# Patient Record
Sex: FEMALE | Race: WHITE | NOT HISPANIC OR LATINO | Employment: OTHER | URBAN - METROPOLITAN AREA
[De-identification: names, ages, dates, MRNs, and addresses within clinical notes are randomized per-mention and may not be internally consistent; named-entity substitution may affect disease eponyms.]

---

## 2017-08-25 ENCOUNTER — HOSPITAL ENCOUNTER (OUTPATIENT)
Dept: RADIOLOGY | Facility: MEDICAL CENTER | Age: 62
End: 2017-08-25
Attending: FAMILY MEDICINE
Payer: COMMERCIAL

## 2017-08-25 DIAGNOSIS — Z12.31 ENCOUNTER FOR SCREENING MAMMOGRAM FOR MALIGNANT NEOPLASM OF BREAST: ICD-10-CM

## 2017-08-25 PROCEDURE — G0202 SCR MAMMO BI INCL CAD: HCPCS

## 2017-11-21 ENCOUNTER — OFFICE VISIT (OUTPATIENT)
Dept: URGENT CARE | Facility: PHYSICIAN GROUP | Age: 62
End: 2017-11-21

## 2017-11-21 VITALS
RESPIRATION RATE: 14 BRPM | SYSTOLIC BLOOD PRESSURE: 116 MMHG | DIASTOLIC BLOOD PRESSURE: 70 MMHG | WEIGHT: 195 LBS | HEART RATE: 84 BPM | BODY MASS INDEX: 33.29 KG/M2 | OXYGEN SATURATION: 98 % | TEMPERATURE: 97.8 F | HEIGHT: 64 IN

## 2017-11-21 DIAGNOSIS — L60.0 INGROWING TOENAIL WITH INFECTION: ICD-10-CM

## 2017-11-21 PROCEDURE — 99204 OFFICE O/P NEW MOD 45 MIN: CPT | Performed by: FAMILY MEDICINE

## 2017-11-21 RX ORDER — DOXYCYCLINE HYCLATE 100 MG
100 TABLET ORAL 2 TIMES DAILY
Qty: 20 TAB | Refills: 0 | Status: SHIPPED | OUTPATIENT
Start: 2017-11-21 | End: 2017-12-01

## 2017-11-21 ASSESSMENT — ENCOUNTER SYMPTOMS
SHORTNESS OF BREATH: 0
NUMBNESS: 0
DIZZINESS: 0
SORE THROAT: 0
FEVER: 0
WEAKNESS: 0
NAUSEA: 0
EYE PAIN: 0
VOMITING: 0
MYALGIAS: 0
CHILLS: 0

## 2017-11-21 ASSESSMENT — PAIN SCALES - GENERAL: PAINLEVEL: 8=MODERATE-SEVERE PAIN

## 2017-11-21 NOTE — PATIENT INSTRUCTIONS
Infected Ingrown Toenail  An infected ingrown toenail occurs when the nail edge grows into the skin and bacteria invade the area. Symptoms include pain, tenderness, swelling, and pus drainage from the edge of the nail. Poorly fitting shoes, minor injuries, and improper cutting of the toenail may also contribute to the problem. You should cut your toenails squarely instead of rounding the edges. Do not cut them too short. Avoid tight or pointed toe shoes. Sometimes the ingrown portion of the nail must be removed. If your toenail is removed, it can take 3-4 months for it to re-grow.  HOME CARE INSTRUCTIONS   · Soak your infected toe in warm water for 20-30 minutes, 2 to 3 times a day.  · Packing or dressings applied to the area should be changed daily.  · Take medicine as directed and finish them.  · Reduce activities and keep your foot elevated when able to reduce swelling and discomfort. Do this until the infection gets better.  · Wear sandals or go barefoot as much as possible while the infected area is sensitive.  · See your caregiver for follow-up care in 2-3 days if the infection is not better.  SEEK MEDICAL CARE IF:   Your toe is becoming more red, swollen or painful.  MAKE SURE YOU:   · Understand these instructions.  · Will watch your condition.  · Will get help right away if you are not doing well or get worse.  Document Released: 01/25/2006 Document Revised: 03/11/2013 Document Reviewed: 12/14/2009  [employer]® Patient Information ©2014 [employer].

## 2017-11-21 NOTE — PROGRESS NOTES
"  Subjective:     Federica Singleton a 61 y.o. female who presents for Nail Problem (left foot, toe infection, x 10 days)       Nail Problem   This is a new problem. The current episode started 1 to 4 weeks ago. The problem occurs constantly. The problem has been rapidly worsening. Pertinent negatives include no chest pain, chills, fever, myalgias, nausea, numbness, rash, sore throat, vomiting or weakness.   History reviewed. No pertinent past medical history.  Past Surgical History:   Procedure Laterality Date   • GASTRIC BYPASS LAPAROSCOPIC       Social History     Social History   • Marital status:      Spouse name: N/A   • Number of children: N/A   • Years of education: N/A     Occupational History   • Not on file.     Social History Main Topics   • Smoking status: Former Smoker     Quit date: 11/21/2016   • Smokeless tobacco: Never Used   • Alcohol use No   • Drug use: No   • Sexual activity: Not on file     Other Topics Concern   • Not on file     Social History Narrative   • No narrative on file      Family History   Problem Relation Age of Onset   • Stroke Neg Hx    • Heart Disease Neg Hx     Review of Systems   Constitutional: Negative for chills and fever.   HENT: Negative for sore throat.    Eyes: Negative for pain.   Respiratory: Negative for shortness of breath.    Cardiovascular: Negative for chest pain.   Gastrointestinal: Negative for nausea and vomiting.   Genitourinary: Negative for hematuria.   Musculoskeletal: Negative for myalgias.   Skin: Negative for rash.   Neurological: Negative for dizziness, weakness and numbness.     Allergies   Allergen Reactions   • Sulfa Drugs Hives      Objective:   /70   Pulse 84   Temp 36.6 °C (97.8 °F)   Resp 14   Ht 1.626 m (5' 4\")   Wt 88.5 kg (195 lb)   LMP 11/21/2007   SpO2 98%   Breastfeeding? No   BMI 33.47 kg/m²   Physical Exam   Constitutional: She is oriented to person, place, and time. She appears well-developed and well-nourished. No " distress.   HENT:   Head: Normocephalic and atraumatic.   Eyes: Conjunctivae and EOM are normal. Pupils are equal, round, and reactive to light.   Cardiovascular: Normal rate and regular rhythm.    No murmur heard.  Pulmonary/Chest: Effort normal and breath sounds normal. No respiratory distress.   Abdominal: Soft. She exhibits no distension. There is no tenderness.   Neurological: She is alert and oriented to person, place, and time. She has normal reflexes. No sensory deficit.   Skin: Skin is warm and dry.        Psychiatric: She has a normal mood and affect.         Assessment/Plan:   Assessment    1. Ingrowing toenail with infection  Differential diagnosis, natural history, supportive care, and indications for immediate follow-up discussed.   - doxycycline (VIBRAMYCIN) 100 MG Tab; Take 1 Tab by mouth 2 times a day for 10 days.  Dispense: 20 Tab; Refill: 0

## 2018-02-02 ENCOUNTER — HOSPITAL ENCOUNTER (OUTPATIENT)
Dept: LAB | Facility: MEDICAL CENTER | Age: 63
End: 2018-02-02
Attending: FAMILY MEDICINE
Payer: COMMERCIAL

## 2018-02-02 LAB
25(OH)D3 SERPL-MCNC: 5 NG/ML (ref 30–100)
ALBUMIN SERPL BCP-MCNC: 3.6 G/DL (ref 3.2–4.9)
ALBUMIN/GLOB SERPL: 1.2 G/DL
ALP SERPL-CCNC: 202 U/L (ref 30–99)
ALT SERPL-CCNC: 16 U/L (ref 2–50)
ANION GAP SERPL CALC-SCNC: 5 MMOL/L (ref 0–11.9)
AST SERPL-CCNC: 21 U/L (ref 12–45)
BASOPHILS # BLD AUTO: 1.1 % (ref 0–1.8)
BASOPHILS # BLD: 0.04 K/UL (ref 0–0.12)
BILIRUB SERPL-MCNC: 0.3 MG/DL (ref 0.1–1.5)
BUN SERPL-MCNC: 9 MG/DL (ref 8–22)
CALCIUM SERPL-MCNC: 8.4 MG/DL (ref 8.5–10.5)
CHLORIDE SERPL-SCNC: 109 MMOL/L (ref 96–112)
CHOLEST SERPL-MCNC: 153 MG/DL (ref 100–199)
CO2 SERPL-SCNC: 25 MMOL/L (ref 20–33)
CREAT SERPL-MCNC: 0.75 MG/DL (ref 0.5–1.4)
EOSINOPHIL # BLD AUTO: 0.11 K/UL (ref 0–0.51)
EOSINOPHIL NFR BLD: 2.9 % (ref 0–6.9)
ERYTHROCYTE [DISTWIDTH] IN BLOOD BY AUTOMATED COUNT: 47.8 FL (ref 35.9–50)
FERRITIN SERPL-MCNC: 5 NG/ML (ref 10–291)
GLOBULIN SER CALC-MCNC: 3 G/DL (ref 1.9–3.5)
GLUCOSE SERPL-MCNC: 76 MG/DL (ref 65–99)
HCT VFR BLD AUTO: 36 % (ref 37–47)
HDLC SERPL-MCNC: 57 MG/DL
HGB BLD-MCNC: 11.1 G/DL (ref 12–16)
IMM GRANULOCYTES # BLD AUTO: 0.01 K/UL (ref 0–0.11)
IMM GRANULOCYTES NFR BLD AUTO: 0.3 % (ref 0–0.9)
LDLC SERPL CALC-MCNC: 84 MG/DL
LYMPHOCYTES # BLD AUTO: 1.28 K/UL (ref 1–4.8)
LYMPHOCYTES NFR BLD: 34 % (ref 22–41)
MCH RBC QN AUTO: 23.9 PG (ref 27–33)
MCHC RBC AUTO-ENTMCNC: 30.8 G/DL (ref 33.6–35)
MCV RBC AUTO: 77.4 FL (ref 81.4–97.8)
MONOCYTES # BLD AUTO: 0.36 K/UL (ref 0–0.85)
MONOCYTES NFR BLD AUTO: 9.6 % (ref 0–13.4)
NEUTROPHILS # BLD AUTO: 1.96 K/UL (ref 2–7.15)
NEUTROPHILS NFR BLD: 52.1 % (ref 44–72)
NRBC # BLD AUTO: 0 K/UL
NRBC BLD-RTO: 0 /100 WBC
PLATELET # BLD AUTO: 222 K/UL (ref 164–446)
PMV BLD AUTO: 11.3 FL (ref 9–12.9)
POTASSIUM SERPL-SCNC: 4.3 MMOL/L (ref 3.6–5.5)
PROT SERPL-MCNC: 6.6 G/DL (ref 6–8.2)
RBC # BLD AUTO: 4.65 M/UL (ref 4.2–5.4)
SODIUM SERPL-SCNC: 139 MMOL/L (ref 135–145)
TRIGL SERPL-MCNC: 62 MG/DL (ref 0–149)
WBC # BLD AUTO: 3.8 K/UL (ref 4.8–10.8)

## 2018-02-02 PROCEDURE — 82728 ASSAY OF FERRITIN: CPT

## 2018-02-02 PROCEDURE — 82306 VITAMIN D 25 HYDROXY: CPT

## 2018-02-02 PROCEDURE — 80061 LIPID PANEL: CPT

## 2018-02-02 PROCEDURE — 80053 COMPREHEN METABOLIC PANEL: CPT

## 2018-02-02 PROCEDURE — 36415 COLL VENOUS BLD VENIPUNCTURE: CPT

## 2018-02-02 PROCEDURE — 85025 COMPLETE CBC W/AUTO DIFF WBC: CPT

## 2018-02-07 ENCOUNTER — HOSPITAL ENCOUNTER (OUTPATIENT)
Dept: LAB | Facility: MEDICAL CENTER | Age: 63
End: 2018-02-07
Attending: FAMILY MEDICINE
Payer: COMMERCIAL

## 2018-02-07 LAB — GGT SERPL-CCNC: 8 U/L (ref 7–34)

## 2018-02-07 PROCEDURE — 86255 FLUORESCENT ANTIBODY SCREEN: CPT

## 2018-02-07 PROCEDURE — 82977 ASSAY OF GGT: CPT

## 2018-02-07 PROCEDURE — 36415 COLL VENOUS BLD VENIPUNCTURE: CPT

## 2018-02-08 LAB — MITOCHONDRIA M2 IGG SER-ACNC: 3.7 UNITS (ref 0–20)

## 2018-03-14 ENCOUNTER — HOSPITAL ENCOUNTER (OUTPATIENT)
Dept: LAB | Facility: MEDICAL CENTER | Age: 63
End: 2018-03-14
Attending: FAMILY MEDICINE
Payer: COMMERCIAL

## 2018-03-14 LAB
25(OH)D3 SERPL-MCNC: 52 NG/ML (ref 30–100)
FERRITIN SERPL-MCNC: 7 NG/ML (ref 10–291)

## 2018-03-14 PROCEDURE — 36415 COLL VENOUS BLD VENIPUNCTURE: CPT

## 2018-03-14 PROCEDURE — 82728 ASSAY OF FERRITIN: CPT

## 2018-03-14 PROCEDURE — 82306 VITAMIN D 25 HYDROXY: CPT

## 2018-06-09 ENCOUNTER — HOSPITAL ENCOUNTER (OUTPATIENT)
Dept: LAB | Facility: MEDICAL CENTER | Age: 63
End: 2018-06-09
Attending: FAMILY MEDICINE
Payer: COMMERCIAL

## 2018-06-09 LAB
25(OH)D3 SERPL-MCNC: 18 NG/ML (ref 30–100)
ALP SERPL-CCNC: 185 U/L (ref 30–99)
FERRITIN SERPL-MCNC: 22.7 NG/ML (ref 10–291)
HCT VFR BLD AUTO: 43.9 % (ref 37–47)
HGB BLD-MCNC: 14.4 G/DL (ref 12–16)

## 2018-06-09 PROCEDURE — 82306 VITAMIN D 25 HYDROXY: CPT

## 2018-06-09 PROCEDURE — 85014 HEMATOCRIT: CPT

## 2018-06-09 PROCEDURE — 36415 COLL VENOUS BLD VENIPUNCTURE: CPT

## 2018-06-09 PROCEDURE — 84075 ASSAY ALKALINE PHOSPHATASE: CPT

## 2018-06-09 PROCEDURE — 85018 HEMOGLOBIN: CPT

## 2018-06-09 PROCEDURE — 82728 ASSAY OF FERRITIN: CPT

## 2018-10-11 ENCOUNTER — IMMUNIZATION (OUTPATIENT)
Dept: SOCIAL WORK | Facility: CLINIC | Age: 63
End: 2018-10-11
Payer: COMMERCIAL

## 2018-10-11 DIAGNOSIS — Z23 NEED FOR VACCINATION: ICD-10-CM

## 2018-10-11 PROCEDURE — 90686 IIV4 VACC NO PRSV 0.5 ML IM: CPT | Performed by: REGISTERED NURSE

## 2018-10-11 PROCEDURE — 90471 IMMUNIZATION ADMIN: CPT | Performed by: REGISTERED NURSE

## 2018-10-24 ENCOUNTER — OFFICE VISIT (OUTPATIENT)
Dept: URGENT CARE | Facility: PHYSICIAN GROUP | Age: 63
End: 2018-10-24
Payer: COMMERCIAL

## 2018-10-24 VITALS
HEART RATE: 72 BPM | TEMPERATURE: 99 F | WEIGHT: 196 LBS | OXYGEN SATURATION: 100 % | RESPIRATION RATE: 16 BRPM | SYSTOLIC BLOOD PRESSURE: 116 MMHG | DIASTOLIC BLOOD PRESSURE: 70 MMHG | BODY MASS INDEX: 33.46 KG/M2 | HEIGHT: 64 IN

## 2018-10-24 DIAGNOSIS — J06.9 VIRAL URI WITH COUGH: ICD-10-CM

## 2018-10-24 LAB
FLUAV+FLUBV AG SPEC QL IA: NEGATIVE
INT CON NEG: NORMAL
INT CON NEG: NORMAL
INT CON POS: NORMAL
INT CON POS: NORMAL
S PYO AG THROAT QL: NEGATIVE

## 2018-10-24 PROCEDURE — 87880 STREP A ASSAY W/OPTIC: CPT | Performed by: FAMILY MEDICINE

## 2018-10-24 PROCEDURE — 99214 OFFICE O/P EST MOD 30 MIN: CPT | Performed by: FAMILY MEDICINE

## 2018-10-24 PROCEDURE — 87804 INFLUENZA ASSAY W/OPTIC: CPT | Performed by: FAMILY MEDICINE

## 2018-10-24 NOTE — PROGRESS NOTES
"CC:  cough        Cough  This is a new problem. The current episode started 2 days ago. The problem has been unchanged. The problem occurs constantly. The cough is dry. Associated symptoms include : sore throat, fever , but she denies muscle aches. Pertinent negatives include no    , nausea, vomiting, diarrhea, sweats, weight loss or wheezing. Nothing aggravates the symptoms.  Patient has tried nothing for the symptoms. There is no history of asthma.       Past medical history was unremarkable and not pertinent to current issue      Social History   Substance Use Topics   • Smoking status: Former Smoker     Quit date: 11/21/2016   • Smokeless tobacco: Never Used   • Alcohol use No         No current outpatient prescriptions on file prior to visit.     No current facility-administered medications on file prior to visit.                     Review of Systems   Constitutional: Negative for   weight loss. + subj fever at home   HENT: negative for otalgia  Cardiovascular - denies chest pain or dyspnea  Respiratory: Positive for cough.  .  Negative for wheezing.    Neurological: Negative for headaches.   GI - denies nausea, vomiting or diarrhea  Neuro - denies numbness or tingling.            Objective:     Blood pressure 116/70, pulse 72, temperature 37.2 °C (99 °F), temperature source Temporal, resp. rate 16, height 1.626 m (5' 4\"), weight 88.9 kg (196 lb), SpO2 100 %, not currently breastfeeding.    Physical Exam   Constitutional: patient is oriented to person, place, and time. Patient appears well-developed and well-nourished. No distress.   HENT:   Head: Normocephalic and atraumatic.   Right Ear: External ear normal.   Left Ear: External ear normal.   Nose: Mucosal edema  present. Right sinus exhibits no maxillary sinus tenderness. Left sinus exhibits no maxillary sinus tenderness.   Mouth/Throat: Mucous membranes are normal. No oral lesions.  No posterior pharyngeal erythema.  No oropharyngeal exudate or posterior " oropharyngeal edema.   Eyes: Conjunctivae and EOM are normal. Pupils are equal, round, and reactive to light. Right eye exhibits no discharge. Left eye exhibits no discharge. No scleral icterus.   Neck: Normal range of motion. Neck supple. No tracheal deviation present.   Cardiovascular: Normal rate, regular rhythm and normal heart sounds.  Exam reveals no friction rub.    Pulmonary/Chest: Effort normal. No respiratory distress. Patient has no wheezes or rhonchi. Patient has no rales.    Musculoskeletal:  exhibits no edema.   Lymphadenopathy:     Patient has no cervical adenopathy.      Neurological: patient is alert and oriented to person, place, and time.   Skin: Skin is warm and dry. No rash noted. No erythema.   Psychiatric: patient  has a normal mood and affect.  behavior is normal.   Nursing note and vitals reviewed.              Assessment/Plan:       1. Viral URI with cough  Rapid strep, flu neg       Rx motrin 800mg tid, prn  Follow up in one week if no improvement

## 2019-10-24 ENCOUNTER — IMMUNIZATION (OUTPATIENT)
Dept: SOCIAL WORK | Facility: CLINIC | Age: 64
End: 2019-10-24
Payer: COMMERCIAL

## 2019-10-24 DIAGNOSIS — Z23 NEED FOR VACCINATION: ICD-10-CM

## 2019-10-24 PROCEDURE — 90686 IIV4 VACC NO PRSV 0.5 ML IM: CPT | Performed by: REGISTERED NURSE

## 2019-10-24 PROCEDURE — 90471 IMMUNIZATION ADMIN: CPT | Performed by: REGISTERED NURSE

## 2020-07-19 ENCOUNTER — HOSPITAL ENCOUNTER (EMERGENCY)
Dept: HOSPITAL 8 - ED | Age: 65
Discharge: HOME | End: 2020-07-19
Payer: COMMERCIAL

## 2020-07-19 VITALS — SYSTOLIC BLOOD PRESSURE: 125 MMHG | DIASTOLIC BLOOD PRESSURE: 70 MMHG

## 2020-07-19 VITALS — WEIGHT: 227.3 LBS | HEIGHT: 66 IN | BODY MASS INDEX: 36.53 KG/M2
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
## 2020-07-19 DIAGNOSIS — S83.91XA: Primary | ICD-10-CM

## 2020-07-19 DIAGNOSIS — M79.661: ICD-10-CM
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
## 2020-07-19 PROCEDURE — 99284 EMERGENCY DEPT VISIT MOD MDM: CPT

## 2020-07-19 PROCEDURE — 29505 APPLICATION LONG LEG SPLINT: CPT

## 2020-07-19 NOTE — NUR
pt returned from RAD.  pt notified that she is to have vicodin and cannot drive 
after.  pt has SO at bedside and reports that he will be driving her home.  
awaiting splint placement

## 2020-07-19 NOTE — NUR
PT HAS SWLLEING OF THE LOWER R LEG    DENIES LONG EXTENDED TRAVEL   DOES REPORT 
R KNEE INJURY SEVERAL MO AGO

## 2021-03-31 ENCOUNTER — IMMUNIZATION (OUTPATIENT)
Dept: FAMILY PLANNING/WOMEN'S HEALTH CLINIC | Facility: IMMUNIZATION CENTER | Age: 66
End: 2021-03-31
Payer: COMMERCIAL

## 2021-03-31 DIAGNOSIS — Z23 ENCOUNTER FOR VACCINATION: Primary | ICD-10-CM

## 2021-03-31 PROCEDURE — 91300 PFIZER SARS-COV-2 VACCINE: CPT

## 2021-03-31 PROCEDURE — 0001A PFIZER SARS-COV-2 VACCINE: CPT

## 2021-04-22 ENCOUNTER — IMMUNIZATION (OUTPATIENT)
Dept: FAMILY PLANNING/WOMEN'S HEALTH CLINIC | Facility: IMMUNIZATION CENTER | Age: 66
End: 2021-04-22
Attending: INTERNAL MEDICINE
Payer: OTHER GOVERNMENT

## 2021-04-22 DIAGNOSIS — Z23 ENCOUNTER FOR VACCINATION: Primary | ICD-10-CM

## 2021-04-22 PROCEDURE — 0002A PFIZER SARS-COV-2 VACCINE: CPT

## 2021-04-22 PROCEDURE — 91300 PFIZER SARS-COV-2 VACCINE: CPT

## 2022-01-21 ENCOUNTER — PATIENT MESSAGE (OUTPATIENT)
Dept: HEALTH INFORMATION MANAGEMENT | Facility: OTHER | Age: 67
End: 2022-01-21
Payer: MEDICARE

## 2022-03-30 ENCOUNTER — TELEPHONE (OUTPATIENT)
Dept: HEALTH INFORMATION MANAGEMENT | Facility: OTHER | Age: 67
End: 2022-03-30
Payer: MEDICARE

## 2022-03-30 NOTE — TELEPHONE ENCOUNTER
Member returned [employer] message. I scheduled her for her Comprehensive Health Assessment on 03/31/22 at 7 am with Jeane Donnelly. I also scheduled her with a new provider Laurence Tobin on 4/6/22 at 12:40 pm. HIPAA verified. I did go over [employer] with her and her  since she does have to get a ride sometimes. She also had questions regarding eyeglasses and optometrists. I did provide her the number to [facility] customer service. No other questions or concerns. No other questions or concerns.

## 2022-03-31 PROBLEM — E66.01 MORBID OBESITY (HCC): Status: ACTIVE | Noted: 2022-03-31

## 2022-03-31 PROBLEM — E55.9 VITAMIN D DEFICIENCY: Status: ACTIVE | Noted: 2022-03-31

## 2022-03-31 PROBLEM — M15.0 PRIMARY OSTEOARTHRITIS INVOLVING MULTIPLE JOINTS: Status: ACTIVE | Noted: 2022-03-31

## 2022-03-31 PROBLEM — M15.9 PRIMARY OSTEOARTHRITIS INVOLVING MULTIPLE JOINTS: Status: ACTIVE | Noted: 2022-03-31

## 2022-04-06 ENCOUNTER — OFFICE VISIT (OUTPATIENT)
Dept: MEDICAL GROUP | Facility: PHYSICIAN GROUP | Age: 67
End: 2022-04-06
Payer: MEDICARE

## 2022-04-06 VITALS
SYSTOLIC BLOOD PRESSURE: 108 MMHG | TEMPERATURE: 97.6 F | BODY MASS INDEX: 41.82 KG/M2 | DIASTOLIC BLOOD PRESSURE: 70 MMHG | HEART RATE: 68 BPM | RESPIRATION RATE: 18 BRPM | OXYGEN SATURATION: 97 % | HEIGHT: 63 IN | WEIGHT: 236 LBS

## 2022-04-06 DIAGNOSIS — E78.00 ELEVATED LDL CHOLESTEROL LEVEL: ICD-10-CM

## 2022-04-06 DIAGNOSIS — Z78.0 POSTMENOPAUSAL STATUS (AGE-RELATED) (NATURAL): ICD-10-CM

## 2022-04-06 DIAGNOSIS — Z12.12 SCREENING FOR COLORECTAL CANCER: ICD-10-CM

## 2022-04-06 DIAGNOSIS — Z12.31 ENCOUNTER FOR SCREENING MAMMOGRAM FOR BREAST CANCER: ICD-10-CM

## 2022-04-06 DIAGNOSIS — Z11.59 NEED FOR HEPATITIS C SCREENING TEST: ICD-10-CM

## 2022-04-06 DIAGNOSIS — Z23 NEED FOR VACCINATION: ICD-10-CM

## 2022-04-06 DIAGNOSIS — E55.9 VITAMIN D DEFICIENCY: Primary | ICD-10-CM

## 2022-04-06 DIAGNOSIS — R74.8 ELEVATED LIVER ENZYMES: ICD-10-CM

## 2022-04-06 DIAGNOSIS — Z12.11 SCREENING FOR COLORECTAL CANCER: ICD-10-CM

## 2022-04-06 DIAGNOSIS — Z13.79 GENETIC SCREENING: ICD-10-CM

## 2022-04-06 DIAGNOSIS — Z86.2 HISTORY OF IRON DEFICIENCY ANEMIA: ICD-10-CM

## 2022-04-06 PROCEDURE — 90715 TDAP VACCINE 7 YRS/> IM: CPT | Performed by: NURSE PRACTITIONER

## 2022-04-06 PROCEDURE — 99204 OFFICE O/P NEW MOD 45 MIN: CPT | Mod: 25 | Performed by: NURSE PRACTITIONER

## 2022-04-06 PROCEDURE — 90471 IMMUNIZATION ADMIN: CPT | Performed by: NURSE PRACTITIONER

## 2022-04-11 ENCOUNTER — HOSPITAL ENCOUNTER (OUTPATIENT)
Dept: RADIOLOGY | Facility: MEDICAL CENTER | Age: 67
End: 2022-04-11
Attending: NURSE PRACTITIONER
Payer: MEDICARE

## 2022-04-11 DIAGNOSIS — Z78.0 POSTMENOPAUSAL STATUS (AGE-RELATED) (NATURAL): ICD-10-CM

## 2022-04-11 PROBLEM — E55.9 VITAMIN D DEFICIENCY: Chronic | Status: ACTIVE | Noted: 2022-03-31

## 2022-04-11 PROBLEM — E78.00 ELEVATED LDL CHOLESTEROL LEVEL: Chronic | Status: ACTIVE | Noted: 2022-04-06

## 2022-04-11 PROBLEM — Z86.2 HISTORY OF IRON DEFICIENCY ANEMIA: Chronic | Status: ACTIVE | Noted: 2022-04-06

## 2022-04-11 PROBLEM — Z13.79 GENETIC SCREENING: Status: ACTIVE | Noted: 2022-04-11

## 2022-04-11 PROCEDURE — 77080 DXA BONE DENSITY AXIAL: CPT

## 2022-04-11 NOTE — ASSESSMENT & PLAN NOTE
Chronic condition, stable.  Patient has a history of elevated LDL.  She does not take any medication for this at this time.  She is due for updated labs today, and orders were placed.

## 2022-04-11 NOTE — ASSESSMENT & PLAN NOTE
Chronic condition, stable.  Patient reports that she does make an effort at diet and exercise, though she has not been able to lose weight.  She will continue to make efforts in this area.  We will continue to monitor.

## 2022-04-11 NOTE — ASSESSMENT & PLAN NOTE
Chronic condition, stable.  Patient reports that she has a history of iron deficiency anemia.  She has not had any laboratory surveillance for this since 2018.  Labs were ordered for patient today.

## 2022-04-11 NOTE — PROGRESS NOTES
"Subjective:     CC: The primary encounter diagnosis was Vitamin D deficiency. Diagnoses of BMI 40.0-44.9, adult (HCC), History of iron deficiency anemia, Elevated LDL cholesterol level, Genetic screening, Encounter for screening mammogram for breast cancer, Postmenopausal status (age-related) (natural), Need for hepatitis C screening test, Screening for colorectal cancer, Elevated liver enzymes, and Need for vaccination were also pertinent to this visit.      HPI:   Federica is a new patient to me today wanting to establish care. She used to see Dr. Dixon, but has not been seen since approximately 2018.  We discussed the following problems:    1. Vitamin D deficiency  Chronic condition, stable.  Patient here for evaluation today.    2. BMI 40.0-44.9, adult (HCC)  Chronic condition, stable.  Patient here for evaluation today.    3. History of iron deficiency anemia  Chronic condition, stable.  Patient here for evaluation today.    4. Elevated LDL cholesterol level  Chronic condition, stable.  Patient here for evaluation today.    5. Genetic screening  Patient would like to participate in the [facility] [place] Project.  Referral was placed today.     6. Encounter for screening mammogram for breast cancer  Order for screening mammogram placed.     7. Postmenopausal status (age-related) (natural)  Order for Dexa scan placed.    8. Need for hepatitis C screening test  Patient states that she has been told she is a \"carrier\" for hepatitis C in the past. She reports she has not had a history of an active infection.  Lab orders were placed today.    9. Screening for colorectal cancer  Order for screening colonoscopy placed.    10. Elevated liver enzymes  Chronic condition.  Patient reports history of being a \"carrier for hepatitis C.\"  She denies any hepatitis C infection.  Hepatitis panel order was placed today.    11. Need for vaccination  Patient was offered and received tetanus vaccine today.       Past Medical History: " "  Diagnosis Date   • Arthritis     left hip and right knee   • Hearing impaired     coongenital since birth - nerve deafness       Social History     Tobacco Use   • Smoking status: Former Smoker     Packs/day: 1.00     Years: 20.00     Pack years: 20.00     Quit date: 2016     Years since quittin.3   • Smokeless tobacco: Never Used   Vaping Use   • Vaping Use: Never used   Substance Use Topics   • Alcohol use: No   • Drug use: No       No current [facility]-ordered outpatient medications on file.     No current [facility]-ordered facility-administered medications on file.       Allergies:  Aspirin and Sulfa drugs    Health Maintenance: Deferred    ROS:  Gen: no fevers/chills, no changes in weight  Eyes: no changes in vision  ENT: no sore throat, no hearing loss, no bloody nose  Pulm: no sob, no cough  CV: no chest pain, no palpitations  GI: no nausea/vomiting, no diarrhea  : no dysuria  MSk: no myalgias  Skin: no rash  Neuro: no headaches, no numbness/tingling  Heme/Lymph: no easy bruising      Objective:       Exam:  /70 (BP Location: Left arm, Patient Position: Sitting, BP Cuff Size: Adult)   Pulse 68   Temp 36.4 °C (97.6 °F) (Temporal)   Resp 18   Ht 1.6 m (5' 3\")   Wt 107 kg (236 lb)   LMP 2007   SpO2 97%   Breastfeeding No   BMI 41.81 kg/m²  Body mass index is 41.81 kg/m².    Gen: Alert and oriented, No apparent distress.  Neck: Neck is supple without lymphadenopathy.  No carotid bruits.  Lungs: Normal effort, CTA bilaterally, no wheezes, rhonchi, or rales  CV: Regular rate and rhythm. No murmurs, rubs, or gallops.  Ext: No clubbing, cyanosis, edema.    Labs: None    Assessment & Plan:     66 y.o. female with the following -     Vitamin D deficiency  Chronic condition, stable.  Patient has not had an updated vitamin D level since 2018.  She does not take supplemental vitamin D.  Labs were ordered for her today.    BMI 40.0-44.9, adult ([facility])  Chronic condition, stable.  Patient reports that " she does make an effort at diet and exercise, though she has not been able to lose weight.  She will continue to make efforts in this area.  We will continue to monitor.    History of iron deficiency anemia  Chronic condition, stable.  Patient reports that she has a history of iron deficiency anemia.  She has not had any laboratory surveillance for this since 2018.  Labs were ordered for patient today.    Elevated LDL cholesterol level  Chronic condition, stable.  Patient has a history of elevated LDL.  She does not take any medication for this at this time.  She is due for updated labs today, and orders were placed.    Genetic screening  Patient would like to participate in the healthy [place] project.  Referral was placed for patient today.      Orders Placed This Encounter   • MA-SCREENING MAMMO BILAT W/TOMOSYNTHESIS W/CAD   • DS-BONE DENSITY STUDY (DEXA)   • Tdap Vaccine =>8YO IM   • CBC WITH DIFFERENTIAL   • Lipid Profile   • VITAMIN D,25 HYDROXY   • HEPATITIS PANEL ACUTE(4 COMPONENTS)   • TSH WITH REFLEX TO FT4   • HCV Scrn ( [id number] 1xLife)   • IRON/TOTAL IRON BIND   • FERRITIN   • VITAMIN B12   • RETICULOCYTES COUNT   • Referral to GI for Colonoscopy   • Referral to Genetic Research Studies          Return in about 6 months (around 10/6/2022).    I have placed the below orders and discussed them with an approved delegating provider.  The MA is performing the below orders under the direction of Manisha Cedeño MD.    Please note that this dictation was created using voice recognition software. I have made every reasonable attempt to correct obvious errors, but I expect that there are errors of grammar and possibly content that I did not discover before finalizing the note.

## 2022-04-11 NOTE — ASSESSMENT & PLAN NOTE
Chronic condition, stable.  Patient has not had an updated vitamin D level since 2018.  She does not take supplemental vitamin D.  Labs were ordered for her today.

## 2022-04-11 NOTE — ASSESSMENT & PLAN NOTE
Patient would like to participate in the [facility] project.  Referral was placed for patient today.

## 2022-04-21 ENCOUNTER — HOSPITAL ENCOUNTER (OUTPATIENT)
Dept: LAB | Facility: MEDICAL CENTER | Age: 67
End: 2022-04-21
Attending: NURSE PRACTITIONER
Payer: MEDICARE

## 2022-04-21 DIAGNOSIS — Z11.59 NEED FOR HEPATITIS C SCREENING TEST: ICD-10-CM

## 2022-04-21 DIAGNOSIS — E55.9 VITAMIN D DEFICIENCY: ICD-10-CM

## 2022-04-21 DIAGNOSIS — R74.8 ELEVATED LIVER ENZYMES: ICD-10-CM

## 2022-04-21 DIAGNOSIS — Z86.2 HISTORY OF IRON DEFICIENCY ANEMIA: ICD-10-CM

## 2022-04-21 DIAGNOSIS — E78.00 ELEVATED LDL CHOLESTEROL LEVEL: ICD-10-CM

## 2022-04-21 LAB
25(OH)D3 SERPL-MCNC: 29 NG/ML (ref 30–100)
BASOPHILS # BLD AUTO: 0.9 % (ref 0–1.8)
BASOPHILS # BLD: 0.05 K/UL (ref 0–0.12)
CHOLEST SERPL-MCNC: 179 MG/DL (ref 100–199)
EOSINOPHIL # BLD AUTO: 0.09 K/UL (ref 0–0.51)
EOSINOPHIL NFR BLD: 1.6 % (ref 0–6.9)
ERYTHROCYTE [DISTWIDTH] IN BLOOD BY AUTOMATED COUNT: 45 FL (ref 35.9–50)
FASTING STATUS PATIENT QL REPORTED: NORMAL
FERRITIN SERPL-MCNC: 301 NG/ML (ref 10–291)
HAV IGM SERPL QL IA: NORMAL
HBV CORE IGM SER QL: NORMAL
HBV SURFACE AG SER QL: NORMAL
HCT VFR BLD AUTO: 46.1 % (ref 37–47)
HCV AB SER QL: NORMAL
HDLC SERPL-MCNC: 49 MG/DL
HGB BLD-MCNC: 15.4 G/DL (ref 12–16)
HGB RETIC QN AUTO: 35.2 PG/CELL (ref 29–35)
IMM GRANULOCYTES # BLD AUTO: 0.01 K/UL (ref 0–0.11)
IMM GRANULOCYTES NFR BLD AUTO: 0.2 % (ref 0–0.9)
IMM RETICS NFR: 14.3 % (ref 9.3–17.4)
IRON SATN MFR SERPL: 43 % (ref 15–55)
IRON SERPL-MCNC: 110 UG/DL (ref 40–170)
LDLC SERPL CALC-MCNC: 111 MG/DL
LYMPHOCYTES # BLD AUTO: 2.01 K/UL (ref 1–4.8)
LYMPHOCYTES NFR BLD: 35.3 % (ref 22–41)
MCH RBC QN AUTO: 31 PG (ref 27–33)
MCHC RBC AUTO-ENTMCNC: 33.4 G/DL (ref 33.6–35)
MCV RBC AUTO: 92.8 FL (ref 81.4–97.8)
MONOCYTES # BLD AUTO: 0.38 K/UL (ref 0–0.85)
MONOCYTES NFR BLD AUTO: 6.7 % (ref 0–13.4)
NEUTROPHILS # BLD AUTO: 3.15 K/UL (ref 2–7.15)
NEUTROPHILS NFR BLD: 55.3 % (ref 44–72)
NRBC # BLD AUTO: 0 K/UL
NRBC BLD-RTO: 0 /100 WBC
PLATELET # BLD AUTO: 212 K/UL (ref 164–446)
PMV BLD AUTO: 11.4 FL (ref 9–12.9)
RBC # BLD AUTO: 4.97 M/UL (ref 4.2–5.4)
RETICS # AUTO: 0.12 M/UL (ref 0.04–0.06)
RETICS/RBC NFR: 2.4 % (ref 0.8–2.1)
TIBC SERPL-MCNC: 256 UG/DL (ref 250–450)
TRIGL SERPL-MCNC: 95 MG/DL (ref 0–149)
TSH SERPL DL<=0.005 MIU/L-ACNC: 1.34 UIU/ML (ref 0.38–5.33)
UIBC SERPL-MCNC: 146 UG/DL (ref 110–370)
VIT B12 SERPL-MCNC: 187 PG/ML (ref 211–911)
WBC # BLD AUTO: 5.7 K/UL (ref 4.8–10.8)

## 2022-04-21 PROCEDURE — 82728 ASSAY OF FERRITIN: CPT

## 2022-04-21 PROCEDURE — 85046 RETICYTE/HGB CONCENTRATE: CPT

## 2022-04-21 PROCEDURE — 84443 ASSAY THYROID STIM HORMONE: CPT

## 2022-04-21 PROCEDURE — 82306 VITAMIN D 25 HYDROXY: CPT

## 2022-04-21 PROCEDURE — 83540 ASSAY OF IRON: CPT

## 2022-04-21 PROCEDURE — 80061 LIPID PANEL: CPT

## 2022-04-21 PROCEDURE — 85025 COMPLETE CBC W/AUTO DIFF WBC: CPT

## 2022-04-21 PROCEDURE — 83550 IRON BINDING TEST: CPT

## 2022-04-21 PROCEDURE — 82607 VITAMIN B-12: CPT

## 2022-04-21 PROCEDURE — 36415 COLL VENOUS BLD VENIPUNCTURE: CPT

## 2022-04-21 PROCEDURE — 80074 ACUTE HEPATITIS PANEL: CPT

## 2022-04-22 ENCOUNTER — HOSPITAL ENCOUNTER (OUTPATIENT)
Dept: RADIOLOGY | Facility: MEDICAL CENTER | Age: 67
End: 2022-04-22
Attending: NURSE PRACTITIONER
Payer: MEDICARE

## 2022-04-22 DIAGNOSIS — Z12.31 ENCOUNTER FOR SCREENING MAMMOGRAM FOR BREAST CANCER: ICD-10-CM

## 2022-04-22 PROCEDURE — 77063 BREAST TOMOSYNTHESIS BI: CPT

## 2022-04-26 DIAGNOSIS — R74.8 ELEVATED LIVER ENZYMES: ICD-10-CM

## 2022-04-27 PROBLEM — I77.9 DISORDER OF ARTERIES AND ARTERIOLES (HCC): Status: ACTIVE | Noted: 2022-04-27

## 2022-04-28 ENCOUNTER — HOSPITAL ENCOUNTER (OUTPATIENT)
Dept: RADIOLOGY | Facility: MEDICAL CENTER | Age: 67
End: 2022-04-28
Payer: MEDICARE

## 2022-05-05 ENCOUNTER — HOSPITAL ENCOUNTER (OUTPATIENT)
Dept: LAB | Facility: MEDICAL CENTER | Age: 67
End: 2022-05-05
Attending: NURSE PRACTITIONER
Payer: MEDICARE

## 2022-05-05 DIAGNOSIS — R74.8 ELEVATED LIVER ENZYMES: ICD-10-CM

## 2022-05-05 LAB
ALBUMIN SERPL BCP-MCNC: 3.7 G/DL (ref 3.2–4.9)
ALBUMIN/GLOB SERPL: 1.2 G/DL
ALP SERPL-CCNC: 202 U/L (ref 30–99)
ALT SERPL-CCNC: 16 U/L (ref 2–50)
ANION GAP SERPL CALC-SCNC: 11 MMOL/L (ref 7–16)
AST SERPL-CCNC: 17 U/L (ref 12–45)
BILIRUB SERPL-MCNC: 0.2 MG/DL (ref 0.1–1.5)
BUN SERPL-MCNC: 14 MG/DL (ref 8–22)
CALCIUM SERPL-MCNC: 8.7 MG/DL (ref 8.5–10.5)
CHLORIDE SERPL-SCNC: 108 MMOL/L (ref 96–112)
CO2 SERPL-SCNC: 19 MMOL/L (ref 20–33)
CREAT SERPL-MCNC: 0.85 MG/DL (ref 0.5–1.4)
GFR SERPLBLD CREATININE-BSD FMLA CKD-EPI: 76 ML/MIN/1.73 M 2
GLOBULIN SER CALC-MCNC: 3 G/DL (ref 1.9–3.5)
GLUCOSE SERPL-MCNC: 79 MG/DL (ref 65–99)
POTASSIUM SERPL-SCNC: 3.8 MMOL/L (ref 3.6–5.5)
PROT SERPL-MCNC: 6.7 G/DL (ref 6–8.2)
SODIUM SERPL-SCNC: 138 MMOL/L (ref 135–145)

## 2022-05-05 PROCEDURE — 80053 COMPREHEN METABOLIC PANEL: CPT

## 2022-05-05 PROCEDURE — 36415 COLL VENOUS BLD VENIPUNCTURE: CPT

## 2022-05-05 PROCEDURE — 84075 ASSAY ALKALINE PHOSPHATASE: CPT | Mod: XU

## 2022-05-05 PROCEDURE — 84080 ASSAY ALKALINE PHOSPHATASES: CPT

## 2022-05-09 LAB
ALP BONE SERPL-CCNC: 111 U/L (ref 0–55)
ALP ISOS SERPL HS-CCNC: 0 U/L
ALP LIVER SERPL-CCNC: 111 U/L (ref 0–94)
ALP SERPL-CCNC: 222 U/L (ref 40–120)

## 2022-05-22 ENCOUNTER — HOSPITAL ENCOUNTER (EMERGENCY)
Facility: MEDICAL CENTER | Age: 67
End: 2022-05-22
Attending: EMERGENCY MEDICINE
Payer: MEDICARE

## 2022-05-22 ENCOUNTER — APPOINTMENT (OUTPATIENT)
Dept: RADIOLOGY | Facility: MEDICAL CENTER | Age: 67
End: 2022-05-22
Attending: EMERGENCY MEDICINE
Payer: MEDICARE

## 2022-05-22 VITALS
SYSTOLIC BLOOD PRESSURE: 97 MMHG | BODY MASS INDEX: 37.02 KG/M2 | TEMPERATURE: 97.9 F | OXYGEN SATURATION: 92 % | WEIGHT: 235.89 LBS | DIASTOLIC BLOOD PRESSURE: 57 MMHG | HEIGHT: 67 IN | HEART RATE: 75 BPM | RESPIRATION RATE: 16 BRPM

## 2022-05-22 DIAGNOSIS — B34.9 VIRAL SYNDROME: ICD-10-CM

## 2022-05-22 DIAGNOSIS — R05.9 COUGH: ICD-10-CM

## 2022-05-22 DIAGNOSIS — R11.0 NAUSEA: ICD-10-CM

## 2022-05-22 LAB
FLUAV RNA SPEC QL NAA+PROBE: NEGATIVE
FLUBV RNA SPEC QL NAA+PROBE: NEGATIVE
RSV RNA SPEC QL NAA+PROBE: NEGATIVE
SARS-COV-2 RNA RESP QL NAA+PROBE: DETECTED
SPECIMEN SOURCE: ABNORMAL

## 2022-05-22 PROCEDURE — 0241U HCHG SARS-COV-2 COVID-19 NFCT DS RESP RNA 4 TRGT MIC: CPT

## 2022-05-22 PROCEDURE — C9803 HOPD COVID-19 SPEC COLLECT: HCPCS | Performed by: EMERGENCY MEDICINE

## 2022-05-22 PROCEDURE — 71045 X-RAY EXAM CHEST 1 VIEW: CPT

## 2022-05-22 PROCEDURE — 99284 EMERGENCY DEPT VISIT MOD MDM: CPT

## 2022-05-22 PROCEDURE — A9270 NON-COVERED ITEM OR SERVICE: HCPCS | Performed by: EMERGENCY MEDICINE

## 2022-05-22 PROCEDURE — 700111 HCHG RX REV CODE 636 W/ 250 OVERRIDE (IP): Performed by: EMERGENCY MEDICINE

## 2022-05-22 PROCEDURE — 700102 HCHG RX REV CODE 250 W/ 637 OVERRIDE(OP): Performed by: EMERGENCY MEDICINE

## 2022-05-22 RX ORDER — ONDANSETRON 4 MG/1
4 TABLET, ORALLY DISINTEGRATING ORAL ONCE
Status: COMPLETED | OUTPATIENT
Start: 2022-05-22 | End: 2022-05-22

## 2022-05-22 RX ORDER — ONDANSETRON 4 MG/1
4 TABLET, ORALLY DISINTEGRATING ORAL EVERY 6 HOURS PRN
Qty: 10 TABLET | Refills: 2 | Status: SHIPPED | OUTPATIENT
Start: 2022-05-22 | End: 2022-06-13

## 2022-05-22 RX ORDER — ALBUTEROL SULFATE 90 UG/1
2 AEROSOL, METERED RESPIRATORY (INHALATION) EVERY 6 HOURS PRN
Qty: 8.5 G | Refills: 0 | Status: SHIPPED | OUTPATIENT
Start: 2022-05-22 | End: 2023-07-13

## 2022-05-22 RX ORDER — ALBUTEROL SULFATE 90 UG/1
2 AEROSOL, METERED RESPIRATORY (INHALATION) ONCE
Status: COMPLETED | OUTPATIENT
Start: 2022-05-22 | End: 2022-05-22

## 2022-05-22 RX ADMIN — ONDANSETRON 4 MG: 4 TABLET, ORALLY DISINTEGRATING ORAL at 18:04

## 2022-05-22 RX ADMIN — ALBUTEROL SULFATE 2 PUFF: 90 AEROSOL, METERED RESPIRATORY (INHALATION) at 18:12

## 2022-05-22 ASSESSMENT — ENCOUNTER SYMPTOMS
SHORTNESS OF BREATH: 0
DIZZINESS: 0
ABDOMINAL PAIN: 0
COUGH: 1
NAUSEA: 1
FEVER: 1

## 2022-05-22 ASSESSMENT — FIBROSIS 4 INDEX: FIB4 SCORE: 1.3

## 2022-05-22 NOTE — ED TRIAGE NOTES
"Pt bib her [employer].  Chief Complaint   Patient presents with   • Fever   • Cough   • Nausea     Symptoms x1d. Negative home covid test today.   Pt took APAP PTA. Unk dose. \"4 pills\"  "

## 2022-05-23 NOTE — ED PROVIDER NOTES
ED Provider Note    Scribed for Julissa Paul M.D. by Johny Ferrer. 2022, 5:58 PM.    Primary care provider: KEILA Layton  Means of arrival: Walk-In  History obtained from: Patient  History limited by: None    CHIEF COMPLAINT  Chief Complaint   Patient presents with   • Fever   • Cough   • Nausea     HPI  Federica Dias is a 66 y.o. female who presents to the Emergency Department for worsening cough and fever onset 2 days ago. She reports her fever peaked at 102.2 °F prior to arrival. Patient reports her cough has been constant. Due to her symptoms the patient was prompted to present to the ED. She reports associated nausea. There are no exacerbating factors reported. Patient and  attempted to alleviate symptoms with cold compress and tylenol. She denies associated abdominal pain. Patient reports a history of pneumonia and hip surgery.    REVIEW OF SYSTEMS  Review of Systems   Constitutional: Positive for fever.   Respiratory: Positive for cough. Negative for shortness of breath.    Cardiovascular: Negative for chest pain.   Gastrointestinal: Positive for nausea. Negative for abdominal pain.   Neurological: Negative for dizziness.   All other systems reviewed and are negative.    PAST MEDICAL HISTORY   has a past medical history of Arthritis and Hearing impaired.    SURGICAL HISTORY   has a past surgical history that includes gastric bypass laparoscopic; gastric bypass laparoscopic (); and other (Left, ).    SOCIAL HISTORY  Social History     Tobacco Use   • Smoking status: Former Smoker     Packs/day: 1.00     Years: 20.00     Pack years: 20.00     Quit date: 2016     Years since quittin.5   • Smokeless tobacco: Never Used   Vaping Use   • Vaping Use: Never used   Substance Use Topics   • Alcohol use: No   • Drug use: No      Social History     Substance and Sexual Activity   Drug Use No       FAMILY HISTORY  Family History   Problem Relation Age of Onset   • Cancer  "Mother         ovarian cancer   • Solid Tumor Mother         brain   • Diabetes Maternal Aunt         [place]   • Stroke Neg Hx    • Heart Disease Neg Hx        CURRENT MEDICATIONS  Home Medications     Reviewed by Dorothy Morales R.N. (Registered Nurse) on 05/22/22 at 1648  Med List Status: Partial   Medication Last Dose Status        Patient Drew Taking any Medications                       ALLERGIES  Allergies   Allergen Reactions   • Aspirin      hives   • Sulfa Drugs Hives       PHYSICAL EXAM  VITAL SIGNS: /73   Pulse 98   Temp 37.1 °C (98.8 °F) (Oral)   Resp 18   Ht 1.702 m (5' 7\")   Wt 107 kg (235 lb 14.3 oz)   LMP 11/21/2007   SpO2 94%   BMI 36.95 kg/m²   Vitals reviewed by myself.  Physical Exam  Nursing note and vitals reviewed.  Constitutional: Well-developed and well-nourished. Mild distress.   HENT: Head is normocephalic and atraumatic.  Eyes: extra-ocular movements intact  Cardiovascular: Regular rate and regular rhythm. No murmur heard.  Pulmonary/Chest: Breath sounds normal. No wheezes or rales.   Abdominal: Soft and non-tender. No distention.    Musculoskeletal: Extremities exhibit normal range of motion without edema or tenderness.   Neurological: Awake and alert  Skin: Skin is warm and dry. No rash.     DIAGNOSTIC STUDIES /  LABS  Labs Reviewed   COV-2, FLU A/B, AND RSV BY PCR (CEPVeridID) - Abnormal; Notable for the following components:       Result Value    SARS-CoV-2 by PCR DETECTED (*)     All other components within normal limits       All labs reviewed by me.    RADIOLOGY  DX-CHEST-PORTABLE (1 VIEW)   Final Result      1.  There is no acute cardiopulmonary process.        The radiologist's interpretation of all radiological studies have been reviewed by me.    REASSESSMENT  5:58 PM - Patient evaluated at bedside. Ordered labs and imaging to evaluate. Discussed plan of care with patient. I informed them that labs and imaging will be ordered to evaluate symptoms. Patient is " understanding and agreeable with plan.       6:58 PM - Patient was reevaluated at bedside. I then informed the patient of my plan for discharge, which includes strict return precautions for any new or worsening symptoms. She understands and verbalizes agreement to plan of care. She is comfortable going home at this time.        COURSE & MEDICAL DECISION MAKING  Nursing notes, VS, PMSFHx reviewed in chart.    Patient is a 66-year-old female who comes in for evaluation of cough and fevers.  Differential diagnosis includes viral syndrome, upper respiratory infection, pneumonia.  Diagnostic work-up includes chest x-ray and viral swabs.    Patient's initial vitals are within normal limits.  She is treated with Zofran and albuterol after which she feels improved.  Chest x-ray returns and demonstrates no evidence of pneumonia.  Therefore patient is reassured and advised on symptomatic management of likely viral illness.  She is given strict return precautions and discharged in stable condition.  Of note after patient was discharged her COVID swab did return positive, I attempted to contact patient and notify her of the result however there is no answer.  I did leave a message that patient can follow-up results in [facility].    The patient will return for new or worsening symptoms and is stable at the time of discharge.    The patient is referred to a primary physician for blood pressure management, diabetic screening, and for all other preventative health concerns.    DISPOSITION:  Patient will be discharged home in stable condition.    OUTPATIENT MEDICATIONS:  Discharge Medication List as of 5/22/2022  7:18 PM      START taking these medications    Details   albuterol 108 (90 Base) MCG/ACT Aero Soln inhalation aerosol Inhale 2 Puffs every 6 hours as needed for Shortness of Breath., Disp-8.5 g, R-0, Normal      ondansetron (ZOFRAN ODT) 4 MG TABLET DISPERSIBLE Take 1 Tablet by mouth every 6 hours as needed for Nausea.,  Disp-10 Tablet, R-2, Normal              FINAL IMPRESSION  1. Viral syndrome    2. Nausea    3. Cough          IJohny (Scribe), am scribing for, and in the presence of, Julissa Paul M.D..    Electronically signed by: Johny Ferrer (Scribe), 5/22/2022    Julissa SHARMA M.D. personally performed the services described in this documentation, as scribed by Johny Ferrer in my presence, and it is both accurate and complete.    The note accurately reflects work and decisions made by me.  Julissa Paul M.D.  5/22/2022  7:37 PM

## 2022-05-23 NOTE — ED NOTES
Pt and family member at bedside verbalize understanding of discharge instructions and follow up/prescription . Pt able to ambulate out to ED lobby with all belongings.Pt educated on swab results in my chart.

## 2022-06-13 ENCOUNTER — OFFICE VISIT (OUTPATIENT)
Dept: MEDICAL GROUP | Facility: PHYSICIAN GROUP | Age: 67
End: 2022-06-13
Payer: MEDICARE

## 2022-06-13 VITALS
WEIGHT: 233.6 LBS | SYSTOLIC BLOOD PRESSURE: 110 MMHG | OXYGEN SATURATION: 95 % | DIASTOLIC BLOOD PRESSURE: 70 MMHG | TEMPERATURE: 97.8 F | HEART RATE: 62 BPM | HEIGHT: 63 IN | RESPIRATION RATE: 16 BRPM | BODY MASS INDEX: 41.39 KG/M2

## 2022-06-13 DIAGNOSIS — U07.1 COVID-19: Primary | ICD-10-CM

## 2022-06-13 DIAGNOSIS — R74.8 ELEVATED LIVER ENZYMES: ICD-10-CM

## 2022-06-13 PROCEDURE — 99214 OFFICE O/P EST MOD 30 MIN: CPT | Performed by: NURSE PRACTITIONER

## 2022-06-13 RX ORDER — METHYLPREDNISOLONE 4 MG/1
TABLET ORAL
Qty: 21 TABLET | Refills: 0 | Status: SHIPPED | OUTPATIENT
Start: 2022-06-13 | End: 2022-07-06

## 2022-06-13 ASSESSMENT — FIBROSIS 4 INDEX: FIB4 SCORE: 1.3

## 2022-06-13 NOTE — LETTER
June 13, 2022       Patient: Federica Dias   YOB: 1955   Date of Visit: 6/13/2022         To Whom It May Concern:    In my medical opinion, I recommend that Federica Dias return to work on July 4, 2022 due to health condition.    If you have any questions or concerns, please don't hesitate to call [phone]          Sincerely,          RICH Layton.  Electronically Signed

## 2022-06-13 NOTE — ASSESSMENT & PLAN NOTE
Acute condition, still symptomatic.  Patient tested positive for covid on 5/22/2022.  She states she is somewhat improved, but remains short of breath.  She worked 40 hours last week and is very wiped out.  She states she cannot walk more than a few feet without stopping to catch her breath.  She would like a work note to be off work longer to help recover.  Work note was given today.  Patient was also given a Medrol Dosepak.  She will follow-up in 2 to 4 weeks if she is not improved

## 2022-06-15 PROBLEM — R74.8 ELEVATED LIVER ENZYMES: Status: ACTIVE | Noted: 2022-06-15

## 2022-06-15 NOTE — PROGRESS NOTES
"Subjective:     CC: The primary encounter diagnosis was COVID-19. A diagnosis of Elevated liver enzymes was also pertinent to this visit.      HPI:   Federica is an established patient of mine here for follow-up.  We discussed the following problems:    1. COVID-19  Acute condition, unstable. Patient here for follow up today.    2. Elevated liver enzymes  Acute condition, unstable.  Patient here for follow up today.       Past Medical History:   Diagnosis Date   • Arthritis     left hip and right knee   • Hearing impaired     coongenital since birth - nerve deafness       Social History     Tobacco Use   • Smoking status: Former Smoker     Packs/day: 1.00     Years: 20.00     Pack years: 20.00     Quit date: 2016     Years since quittin.5   • Smokeless tobacco: Never Used   Vaping Use   • Vaping Use: Never used   Substance Use Topics   • Alcohol use: No   • Drug use: No       Current Outpatient Medications Ordered in Epic   Medication Sig Dispense Refill   • methylPREDNISolone (MEDROL DOSEPAK) 4 MG Tablet Therapy Pack As directed on the packaging label. 21 Tablet 0   • albuterol 108 (90 Base) MCG/ACT Aero Soln inhalation aerosol Inhale 2 Puffs every 6 hours as needed for Shortness of Breath. 8.5 g 0     No current Epic-ordered facility-administered medications on file.       Allergies:  Aspirin and Sulfa drugs    Health Maintenance: Completed    ROS:  Gen: no fevers/chills, no changes in weight  Eyes: no changes in vision  ENT: no sore throat, no hearing loss, no bloody nose  Pulm: +sob, no cough  CV: no chest pain, no palpitations  GI: no nausea/vomiting, no diarrhea  : no dysuria  MSk: no myalgias  Skin: no rash  Neuro: no headaches, no numbness/tingling  Heme/Lymph: no easy bruising      Objective:       Exam:  /70 (BP Location: Right arm, Patient Position: Sitting, BP Cuff Size: Adult)   Pulse 62   Temp 36.6 °C (97.8 °F) (Temporal)   Resp 16   Ht 1.6 m (5' 3\")   Wt 106 kg (233 lb 9.6 oz)   " LMP 11/21/2007   SpO2 95%   BMI 41.38 kg/m²  Body mass index is 41.38 kg/m².    Gen: Alert and oriented, No apparent distress.  Neck: Neck is supple without lymphadenopathy.    Lungs: Normal effort, CTA bilaterally, no wheezes, rhonchi, or rales  CV: Regular rate and rhythm. No murmurs, rubs, or gallops.  Ext: No clubbing, cyanosis, edema.    Labs: Dated: None    Assessment & Plan:     66 y.o. female with the following -     COVID-19  Acute condition, still symptomatic.  Patient tested positive for covid on 5/22/2022.  She states she is somewhat improved, but remains short of breath.  She worked 40 hours last week and is very wiped out.  She states she cannot walk more than a few feet without stopping to catch her breath.  She would like a work note to be off work longer to help recover.  Work note was given today.  Patient was also given a Medrol Dosepak.  She will follow-up in 2 to 4 weeks if she is not improved    Elevated liver enzymes  Acute condition, unstable.  Patient had recently elevated alkaline phosphatase level.  Patient had alkaline phosphatase isoenzymes revealing bone factions of 111 and liver factions of 111.  Patient's recent Dexa scan revealed osteopenia.  Ultrasound of her liver was ordered today.  Patient will follow up after her ultrasound is completed for results.      Orders Placed This Encounter   • US-ABDOMEN COMPLETE SURVEY   • methylPREDNISolone (MEDROL DOSEPAK) 4 MG Tablet Therapy Pack          Return in about 4 weeks (around 7/11/2022).    I have placed the below orders and discussed them with an approved delegating provider.  The MA is performing the below orders under the direction of Manisha Cedeño MD.    Please note that this dictation was created using voice recognition software. I have made every reasonable attempt to correct obvious errors, but I expect that there are errors of grammar and possibly content that I did not discover before finalizing the note.

## 2022-06-15 NOTE — ASSESSMENT & PLAN NOTE
Acute condition, unstable.  Patient had recently elevated alkaline phosphatase level.  Patient had alkaline phosphatase isoenzymes revealing bone factions of 111 and liver factions of 111.  Patient's recent Dexa scan revealed osteopenia.  Ultrasound of her liver was ordered today.  Patient will follow up after her ultrasound is completed for results.

## 2022-06-27 ENCOUNTER — APPOINTMENT (OUTPATIENT)
Dept: RADIOLOGY | Facility: MEDICAL CENTER | Age: 67
End: 2022-06-27
Attending: NURSE PRACTITIONER
Payer: MEDICARE

## 2022-07-06 ENCOUNTER — OFFICE VISIT (OUTPATIENT)
Dept: MEDICAL GROUP | Facility: PHYSICIAN GROUP | Age: 67
End: 2022-07-06
Payer: MEDICARE

## 2022-07-06 VITALS
WEIGHT: 232.6 LBS | OXYGEN SATURATION: 98 % | DIASTOLIC BLOOD PRESSURE: 74 MMHG | RESPIRATION RATE: 18 BRPM | BODY MASS INDEX: 41.21 KG/M2 | HEART RATE: 66 BPM | TEMPERATURE: 98.1 F | SYSTOLIC BLOOD PRESSURE: 120 MMHG | HEIGHT: 63 IN

## 2022-07-06 DIAGNOSIS — U07.1 COVID-19: ICD-10-CM

## 2022-07-06 DIAGNOSIS — Z02.89 ENCOUNTER FOR COMPLETION OF FORM WITH PATIENT: Primary | ICD-10-CM

## 2022-07-06 PROCEDURE — 99213 OFFICE O/P EST LOW 20 MIN: CPT | Performed by: NURSE PRACTITIONER

## 2022-07-06 ASSESSMENT — FIBROSIS 4 INDEX: FIB4 SCORE: 1.3

## 2022-07-06 NOTE — ASSESSMENT & PLAN NOTE
Acute condition, resolved. Patient reports that she is feeling better post Covid. She states that she feels she is ready to go back to work as of July 11, 2022 with no restrictions.  Work release certification paperwork filled out for patient today.

## 2022-07-06 NOTE — PROGRESS NOTES
"Subjective:     CC: The primary encounter diagnosis was Encounter for completion of form with patient. A diagnosis of COVID-19 was also pertinent to this visit.      HPI:   Federica is an established patient of [facility] here for follow-up.  We discussed the following problems:    1. Encounter for completion of form with patient  2. COVID-19  Acute condition, resolved.  Patient is here for follow up.         Past Medical History:   Diagnosis Date   • Arthritis     left hip and right knee   • Hearing impaired     coongenital since birth - nerve deafness       Social History     Tobacco Use   • Smoking status: Former Smoker     Packs/day: 1.00     Years: 20.00     Pack years: 20.00     Quit date: 2016     Years since quittin.6   • Smokeless tobacco: Never Used   Vaping Use   • Vaping Use: Never used   Substance Use Topics   • Alcohol use: No   • Drug use: No       Current Outpatient Medications Ordered in Epic   Medication Sig Dispense Refill   • albuterol 108 (90 Base) MCG/ACT Aero Soln inhalation aerosol Inhale 2 Puffs every 6 hours as needed for Shortness of Breath. 8.5 g 0     No current Epic-ordered facility-administered medications on file.       Allergies:  Aspirin and Sulfa drugs    Health Maintenance: Completed.  Patient to schedule Pap.    ROS:  Gen: no fevers/chills, no changes in weight  Eyes: no changes in vision  ENT: no sore throat, no hearing loss, no bloody nose  Pulm: no sob, no cough  CV: no chest pain, no palpitations  GI: no nausea/vomiting, no diarrhea  : no dysuria  MSk: no myalgias  Skin: no rash  Neuro: no headaches, no numbness/tingling  Heme/Lymph: no easy bruising      Objective:       Exam:  /74 (BP Location: Left arm, Patient Position: Sitting, BP Cuff Size: Large adult)   Pulse 66   Temp 36.7 °C (98.1 °F) (Temporal)   Resp 18   Ht 1.6 m (5' 3\")   Wt 106 kg (232 lb 9.6 oz)   LMP 2007   SpO2 98%   BMI 41.20 kg/m²  Body mass index is 41.2 kg/m².    Gen: Alert and " oriented, No apparent distress.  Neck: Neck is supple without lymphadenopathy.  No carotid bruits.  Lungs: Normal effort, CTA bilaterally, no wheezes, rhonchi, or rales  CV: Regular rate and rhythm. No murmurs, rubs, or gallops.  Ext: No clubbing, cyanosis, edema.    Labs: Dated: None    Assessment & Plan:     66 y.o. female with the following -     COVID-19  Acute condition, resolved. Patient reports that she is feeling better post Covid. She states that she feels she is ready to go back to work as of July 11, 2022 with no restrictions.  Work release certification paperwork filled out for patient today.      No orders of the defined types were placed in this encounter.         Return if symptoms worsen or fail to improve.    I have placed the below orders and discussed them with an approved delegating provider.  The MA is performing the below orders under the direction of Manisha Cedeño MD.    Please note that this dictation was created using voice recognition software. I have made every reasonable attempt to correct obvious errors, but I expect that there are errors of grammar and possibly content that I did not discover before finalizing the note.

## 2022-11-28 ENCOUNTER — DOCUMENTATION (OUTPATIENT)
Dept: HEALTH INFORMATION MANAGEMENT | Facility: OTHER | Age: 67
End: 2022-11-28
Payer: MEDICARE

## 2022-12-15 ENCOUNTER — PHARMACY VISIT (OUTPATIENT)
Dept: PHARMACY | Facility: MEDICAL CENTER | Age: 67
End: 2022-12-15
Payer: COMMERCIAL

## 2022-12-15 PROCEDURE — RXMED WILLOW AMBULATORY MEDICATION CHARGE: Performed by: INTERNAL MEDICINE

## 2023-03-09 ENCOUNTER — HOSPITAL ENCOUNTER (OUTPATIENT)
Dept: RADIOLOGY | Facility: MEDICAL CENTER | Age: 68
End: 2023-03-09
Attending: NURSE PRACTITIONER
Payer: MEDICARE

## 2023-03-09 DIAGNOSIS — R74.8 ELEVATED LIVER ENZYMES: ICD-10-CM

## 2023-03-09 PROCEDURE — 76700 US EXAM ABDOM COMPLETE: CPT

## 2023-06-16 PROBLEM — M85.88 OSTEOPENIA OF SPINE: Status: ACTIVE | Noted: 2023-06-16

## 2023-06-16 PROBLEM — I77.9 DISORDER OF ARTERIES AND ARTERIOLES (HCC): Status: RESOLVED | Noted: 2022-04-27 | Resolved: 2023-06-16

## 2023-07-13 ENCOUNTER — OFFICE VISIT (OUTPATIENT)
Dept: URGENT CARE | Facility: PHYSICIAN GROUP | Age: 68
End: 2023-07-13
Payer: MEDICARE

## 2023-07-13 VITALS
TEMPERATURE: 97.6 F | SYSTOLIC BLOOD PRESSURE: 120 MMHG | BODY MASS INDEX: 37.77 KG/M2 | WEIGHT: 235 LBS | OXYGEN SATURATION: 98 % | HEIGHT: 66 IN | DIASTOLIC BLOOD PRESSURE: 80 MMHG | HEART RATE: 84 BPM | RESPIRATION RATE: 14 BRPM

## 2023-07-13 DIAGNOSIS — H10.32 ACUTE BACTERIAL CONJUNCTIVITIS OF LEFT EYE: ICD-10-CM

## 2023-07-13 PROCEDURE — 99213 OFFICE O/P EST LOW 20 MIN: CPT | Performed by: NURSE PRACTITIONER

## 2023-07-13 PROCEDURE — 3079F DIAST BP 80-89 MM HG: CPT | Performed by: NURSE PRACTITIONER

## 2023-07-13 PROCEDURE — 3074F SYST BP LT 130 MM HG: CPT | Performed by: NURSE PRACTITIONER

## 2023-07-13 RX ORDER — POLYMYXIN B SULFATE AND TRIMETHOPRIM 1; 10000 MG/ML; [USP'U]/ML
1 SOLUTION OPHTHALMIC EVERY 4 HOURS
Qty: 4 ML | Refills: 0 | Status: SHIPPED | OUTPATIENT
Start: 2023-07-13 | End: 2023-07-18

## 2023-07-13 ASSESSMENT — ENCOUNTER SYMPTOMS
CONSTITUTIONAL NEGATIVE: 1
EYE REDNESS: 1
FOREIGN BODY SENSATION: 0
CARDIOVASCULAR NEGATIVE: 1
CHILLS: 0
NAUSEA: 0
BLURRED VISION: 0
FEVER: 0
EYE DISCHARGE: 1
GASTROINTESTINAL NEGATIVE: 1
EYE ITCHING: 1
PHOTOPHOBIA: 1
RESPIRATORY NEGATIVE: 1

## 2023-07-13 ASSESSMENT — FIBROSIS 4 INDEX: FIB4 SCORE: 1.32

## 2023-07-13 NOTE — PROGRESS NOTES
"Subjective:   Federica Dias is a 67 y.o. female who presents for Eye Problem (Redness, watery Lt eye x1d)      Eye Problem   The left eye is affected. This is a new problem. The current episode started yesterday. The problem occurs constantly. The problem has been gradually worsening. There was no injury mechanism. The pain is at a severity of 4/10. The pain is moderate. There is No known exposure to pink eye. She Does not wear contacts. Associated symptoms include an eye discharge, eye redness, itching and photophobia. Pertinent negatives include no blurred vision, fever, foreign body sensation or nausea. She has tried water (Cold compresses) for the symptoms. The treatment provided mild relief.       Review of Systems   Constitutional: Negative.  Negative for chills and fever.   HENT: Negative.     Eyes:  Positive for photophobia, discharge, redness and itching. Negative for blurred vision.   Respiratory: Negative.     Cardiovascular: Negative.    Gastrointestinal: Negative.  Negative for nausea.   Skin: Negative.        Medications, Allergies, and current problem list reviewed today in Epic.     Objective:     /80   Pulse 84   Temp 36.4 °C (97.6 °F) (Temporal)   Resp 14   Ht 1.676 m (5' 6\")   Wt 107 kg (235 lb)   SpO2 98%     Physical Exam  Vitals reviewed.   Constitutional:       General: She is in acute distress.      Appearance: Normal appearance. She is not ill-appearing.   HENT:      Head: Normocephalic and atraumatic.      Nose: Nose normal.      Mouth/Throat:      Mouth: Mucous membranes are moist.      Pharynx: Oropharynx is clear.   Eyes:      General:         Left eye: Discharge and hordeolum present.No foreign body.      Extraocular Movements: Extraocular movements intact.      Conjunctiva/sclera:      Left eye: Left conjunctiva is injected. Exudate present.      Pupils: Pupils are equal, round, and reactive to light.      Comments: Edema of left upper eyelid, looks to be a stye forming " on the upper inner corner.  Conjunctival injection with green discharge noted to outer quadrant.     Cardiovascular:      Rate and Rhythm: Normal rate and regular rhythm.   Pulmonary:      Effort: Pulmonary effort is normal.      Breath sounds: Normal breath sounds.   Abdominal:      General: Abdomen is flat.      Palpations: Abdomen is soft.   Musculoskeletal:         General: Normal range of motion.      Cervical back: Normal range of motion and neck supple.   Skin:     General: Skin is warm and dry.      Capillary Refill: Capillary refill takes less than 2 seconds.   Neurological:      General: No focal deficit present.      Mental Status: She is alert.   Psychiatric:         Mood and Affect: Mood normal.         Behavior: Behavior normal.         Assessment/Plan:     Diagnosis and associated orders:     1. Acute bacterial conjunctivitis of left eye  polymixin-trimethoprim (POLYTRIM) 53887-6.1 UNIT/ML-% Solution         Comments/MDM:     Warm compresses to affected eye(s) 3 times daily  Hand hygiene discussed  Wash all recently used linens and towels in hot water  Do not touch dropper to eye (s)  Discussed with patient that she may be developing a hordeolum to the inner corner.  She will start warm compresses but if she worsens despite warm compresses and topical eyedrops, she will return for reevaluation in 48 hours.          Differential diagnosis, natural history, supportive care, and indications for immediate follow-up discussed.    Advised the patient to follow-up with the primary care physician for recheck, reevaluation, and consideration of further management.    Please note that this dictation was created using voice recognition software. I have made a reasonable attempt to correct obvious errors, but I expect that there are errors of grammar and possibly content that I did not discover before finalizing the note.    This note was electronically signed by ANDREY Barrett

## 2023-07-18 ENCOUNTER — APPOINTMENT (OUTPATIENT)
Dept: URGENT CARE | Facility: PHYSICIAN GROUP | Age: 68
End: 2023-07-18
Payer: MEDICARE

## 2023-07-18 DIAGNOSIS — H10.33 ACUTE BACTERIAL CONJUNCTIVITIS OF BOTH EYES: ICD-10-CM

## 2023-07-18 RX ORDER — OFLOXACIN 3 MG/ML
1 SOLUTION/ DROPS OPHTHALMIC 4 TIMES DAILY
Qty: 10 ML | Refills: 0 | Status: SHIPPED | OUTPATIENT
Start: 2023-07-18 | End: 2023-07-25

## 2023-08-24 ENCOUNTER — PHARMACY VISIT (OUTPATIENT)
Dept: PHARMACY | Facility: MEDICAL CENTER | Age: 68
End: 2023-08-24
Payer: COMMERCIAL

## 2023-08-24 PROCEDURE — RXMED WILLOW AMBULATORY MEDICATION CHARGE: Performed by: INTERNAL MEDICINE

## 2023-10-02 ENCOUNTER — PHARMACY VISIT (OUTPATIENT)
Dept: PHARMACY | Facility: MEDICAL CENTER | Age: 68
End: 2023-10-02
Payer: COMMERCIAL

## 2023-10-02 PROCEDURE — RXMED WILLOW AMBULATORY MEDICATION CHARGE: Performed by: INTERNAL MEDICINE

## 2023-10-02 RX ORDER — INFLUENZA A VIRUS A/MICHIGAN/45/2015 X-275 (H1N1) ANTIGEN (FORMALDEHYDE INACTIVATED), INFLUENZA A VIRUS A/SINGAPORE/INFIMH-16-0019/2016 IVR-186 (H3N2) ANTIGEN (FORMALDEHYDE INACTIVATED), INFLUENZA B VIRUS B/PHUKET/3073/2013 ANTIGEN (FORMALDEHYDE INACTIVATED), AND INFLUENZA B VIRUS B/MARYLAND/15/2016 BX-69A ANTIGEN (FORMALDEHYDE INACTIVATED) 60; 60; 60; 60 UG/.7ML; UG/.7ML; UG/.7ML; UG/.7ML
INJECTION, SUSPENSION INTRAMUSCULAR
Qty: 0.7 ML | Refills: 0 | OUTPATIENT
Start: 2023-10-02

## 2023-10-11 RX ORDER — POLYMYXIN B SULFATE AND TRIMETHOPRIM 1; 10000 MG/ML; [USP'U]/ML
1 SOLUTION OPHTHALMIC EVERY 4 HOURS
Qty: 10 ML | Refills: 0 | OUTPATIENT
Start: 2023-10-11

## 2023-10-20 ENCOUNTER — PHARMACY VISIT (OUTPATIENT)
Dept: PHARMACY | Facility: MEDICAL CENTER | Age: 68
End: 2023-10-20
Payer: COMMERCIAL

## 2023-10-20 PROCEDURE — RXMED WILLOW AMBULATORY MEDICATION CHARGE: Performed by: INTERNAL MEDICINE

## 2024-03-15 ENCOUNTER — APPOINTMENT (OUTPATIENT)
Dept: RADIOLOGY | Facility: MEDICAL CENTER | Age: 69
End: 2024-03-15
Attending: ANESTHESIOLOGY
Payer: MEDICARE

## 2024-03-15 DIAGNOSIS — M54.16 LUMBAR RADICULOPATHY: ICD-10-CM

## 2024-03-15 NOTE — PROGRESS NOTES
Pt here for lumbar MRI. Pt marked on her MRI screening form she has an ear implant.  Pt stated she had surgery at the age of 6, with wires placed and other hardware in her eardrums.  Pt does not have any info on her implants. Informed patient I cannot do her MRI until she can provide documentation on her ear implant; as some ear implants are not safe for MRI.  Advised pt to contact ordering MD and attempt to gather implant info.

## 2024-03-21 ENCOUNTER — APPOINTMENT (OUTPATIENT)
Dept: RADIOLOGY | Facility: MEDICAL CENTER | Age: 69
End: 2024-03-21
Attending: ANESTHESIOLOGY
Payer: MEDICARE

## 2024-03-21 DIAGNOSIS — R93.0 FAMILIAL ENLARGEMENT OF THE SELLA TURCICA: ICD-10-CM

## 2024-03-21 PROCEDURE — 70250 X-RAY EXAM OF SKULL: CPT

## 2024-04-12 ENCOUNTER — APPOINTMENT (OUTPATIENT)
Dept: RADIOLOGY | Facility: MEDICAL CENTER | Age: 69
End: 2024-04-12
Attending: ANESTHESIOLOGY
Payer: MEDICARE

## 2024-04-12 PROCEDURE — 72148 MRI LUMBAR SPINE W/O DYE: CPT

## 2024-08-26 ENCOUNTER — PATIENT MESSAGE (OUTPATIENT)
Dept: HEALTH INFORMATION MANAGEMENT | Facility: OTHER | Age: 69
End: 2024-08-26

## 2024-10-03 ENCOUNTER — PATIENT MESSAGE (OUTPATIENT)
Dept: HEALTH INFORMATION MANAGEMENT | Facility: OTHER | Age: 69
End: 2024-10-03

## 2024-10-10 ENCOUNTER — PATIENT MESSAGE (OUTPATIENT)
Dept: HEALTH INFORMATION MANAGEMENT | Facility: OTHER | Age: 69
End: 2024-10-10

## 2024-11-09 ENCOUNTER — HOSPITAL ENCOUNTER (EMERGENCY)
Facility: MEDICAL CENTER | Age: 69
End: 2024-11-09
Attending: EMERGENCY MEDICINE
Payer: MEDICARE

## 2024-11-09 ENCOUNTER — APPOINTMENT (OUTPATIENT)
Dept: RADIOLOGY | Facility: MEDICAL CENTER | Age: 69
End: 2024-11-09
Attending: EMERGENCY MEDICINE
Payer: MEDICARE

## 2024-11-09 VITALS
DIASTOLIC BLOOD PRESSURE: 71 MMHG | BODY MASS INDEX: 44.3 KG/M2 | OXYGEN SATURATION: 96 % | WEIGHT: 250 LBS | HEART RATE: 81 BPM | TEMPERATURE: 96.8 F | HEIGHT: 63 IN | RESPIRATION RATE: 16 BRPM | SYSTOLIC BLOOD PRESSURE: 134 MMHG

## 2024-11-09 DIAGNOSIS — M25.561 ACUTE PAIN OF RIGHT KNEE: ICD-10-CM

## 2024-11-09 PROCEDURE — 73562 X-RAY EXAM OF KNEE 3: CPT | Mod: RT

## 2024-11-09 PROCEDURE — 700102 HCHG RX REV CODE 250 W/ 637 OVERRIDE(OP): Performed by: EMERGENCY MEDICINE

## 2024-11-09 PROCEDURE — 99284 EMERGENCY DEPT VISIT MOD MDM: CPT

## 2024-11-09 PROCEDURE — A9270 NON-COVERED ITEM OR SERVICE: HCPCS | Performed by: EMERGENCY MEDICINE

## 2024-11-09 PROCEDURE — 73552 X-RAY EXAM OF FEMUR 2/>: CPT | Mod: RT

## 2024-11-09 RX ORDER — ACETAMINOPHEN 500 MG
1000 TABLET ORAL ONCE
Status: COMPLETED | OUTPATIENT
Start: 2024-11-09 | End: 2024-11-09

## 2024-11-09 RX ADMIN — ACETAMINOPHEN 1000 MG: 500 TABLET ORAL at 15:00

## 2024-11-09 ASSESSMENT — PAIN DESCRIPTION - PAIN TYPE: TYPE: ACUTE PAIN

## 2024-11-09 NOTE — ED PROVIDER NOTES
ER Provider Note    Scribed for Isiah Singh D.O. by Kory Whitaker. 11/9/2024  2:50 PM    Primary Care Provider: KEILA Patel    CHIEF COMPLAINT  Chief Complaint   Patient presents with    GLF     Pt says she was bowling at 1400 when her knee locked up and she fell forward onto her knees. - LOC -Head Strike     Knee Pain     8/10 right knee pain, radiating up thigh. Hx of scoliosis.        HPI/ROS  LIMITATION TO HISTORY   None    OUTSIDE HISTORIAN(S):  None    Federica Dias is a 68 y.o. female who presents to the Emergency Department for evaluation of a ground level fall onset prior to arrival. She explains she was bowling when her right knee locked up, causing her to fall forward onto her knees. She denies any loss of consciousness or head strike. She also reports associated right knee pain. She reports a history of osteoarthritis and scoliosis. Patient is ambulatory with a wobble.    ROS as per HPI.    PAST MEDICAL HISTORY  Past Medical History:   Diagnosis Date    Arthritis     left hip and right knee    Hearing impaired     coongenital since birth - nerve deafness    Scoliosis        SURGICAL HISTORY  Past Surgical History:   Procedure Laterality Date    OTHER Left 2002    salivary gland removal    GASTRIC BYPASS LAPAROSCOPIC  1993    GASTRIC BYPASS LAPAROSCOPIC         FAMILY HISTORY  Family History   Problem Relation Age of Onset    Cancer Mother         ovarian cancer    Solid Tumor Mother         brain    Diabetes Maternal Aunt         juvenille    Stroke Neg Hx     Heart Disease Neg Hx        SOCIAL HISTORY   reports that she quit smoking about 7 years ago. Her smoking use included cigarettes. She started smoking about 27 years ago. She has a 20 pack-year smoking history. She has never used smokeless tobacco. She reports that she does not drink alcohol and does not use drugs.    CURRENT MEDICATIONS  Discharge Medication List as of 11/9/2024  4:01 PM        CONTINUE these medications  "which have NOT CHANGED    Details   COVID-19mRNA SpikeVax Moderna (SPIKEVAX) 50 MCG/0.5ML Suspension Prefilled Syringe injection Inject 0.5mL into the muscle as a single dose, Disp-0.5 mL, R-0, Normal      Zoster Vac Recomb Adjuvanted (SHINGRIX) 50 MCG/0.5ML Recon Susp Inject 0.5mL into the muscle as a single dose, Disp-0.5 mL, R-0, Normal      influenza Vac High-Dose Quad (FLUZONE HIGH-DOSE QUADRIVALENT) 0.7 ML Suspension Prefilled Syringe injection Inject  into the shoulder, thigh, or buttocks., Disp-0.7 mL, R-0, Normal      Pneumococcal 20-Nilsa Conj Vacc (PREVNAR 20) 0.5 ML Suspension Prefilled Syringe syringe Inject 0.5 ml into the shoulder, thigh, or buttocks., Disp-0.5 mL, R-0, Normal             ALLERGIES  Aspirin and Sulfa drugs    PHYSICAL EXAM  /83   Pulse 78   Temp 35.9 °C (96.7 °F) (Temporal)   Resp 18   Ht 1.6 m (5' 3\")   Wt 113 kg (250 lb)   LMP 11/21/2007   SpO2 95%   BMI 44.29 kg/m²     General: No acute distress.  HENT: Normocephalic, Mucus membranes are moist.   Chest: Lungs have even and unlabored respirations, Clear to auscultation.   Cardiovascular: Regular rate and regular rhythm, No peripheral cyanosis.  Abdomen: Non distended.  Extremities: Right knee has no deformity, Mild tenderness with limited ROM, Distal vasculatures normal,   Neuro: Awake, Conversive, Able to relay recent events.  Psychiatric: Calm and cooperative.     INITIAL ASSESSMENT  Patient had a locking up of knee with fall. No injury with fall. Knee does hurt. Known history of osteoarthritis. Will treat for pain and evaluate for fracture with X-ray.     ED Observation Status? No; Patient does not meet criteria for ED Observation.     DIAGNOSTIC STUDIES    Radiology:   The attending emergency physician has independently interpreted the diagnostic imaging associated with this visit and am waiting the final reading from the radiologist.   Preliminary interpretation is as follows: No fracture  Radiologist " interpretation:   DX-KNEE 3 VIEWS RIGHT   Final Result      No evidence of fracture or dislocation.   Findings are consistent with mild osteoarthritis.      DX-FEMUR-2+ RIGHT   Final Result      No radiographic evidence of acute traumatic injury.           COURSE & MEDICAL DECISION MAKING     COURSE AND PLAN  2:50 PM - Patient seen and examined at bedside. Discussed plan of care, including imaging. Patient agrees to the plan of care. The patient will be medicated with Tylenol 1,000 mg. Ordered for DX-Femur-Right and DX-Knee-Right to evaluate her symptoms.     3:59 PM - Patient was reevaluated at bedside. Discussed of radiology results with the patient and informed them their results were reassuring. Patient's pain is improved with tylenol. I discussed plan for discharge and follow up as outlined below. The patient is stable for discharge at this time and will return for any new or worsening symptoms. Patient verbalizes understanding and support with my plan for discharge.        ED Summary: The patient was bowling, her knee locked up causing her to fall.  There is no injuries from the fall but her knee does hurt.  She has a history of mild osteoarthritis.  She also has history of scoliosis and she sees [facility].    X-rays were done and there is no fracture.  She has been able to walk with a limp, she does have a cane at home to assist with ambulation.  She was medicated with Tylenol here and the pain is improved she is stable for discharge home with follow-up with her pain management/orthopedics.      DISPOSITION AND DISCUSSIONS  I have discussed management of the patient with the following physicians and JOAN's: None    Discussion of management with other QHP or appropriate source(s): None    Barriers to care at this time, including but not limited to: None known at this time.     The patient will return for new or worsening symptoms and is stable at the time of  discharge.    DISPOSITION:  Patient will be discharged home in stable condition.    FOLLOW UP:  No follow-up provider specified.    FINAL DIAGNOSIS  1. Acute pain of right knee      Kory SHARMA ([place]), am scribing for, and in the presence of, Isiah Singh D.O..    Electronically signed by: Kory Whitaker ([place]), 11/9/2024    Isiah SHARMA D.O. personally performed the services described in this documentation, as scribed by Kory Whitaker in my presence, and it is both accurate and complete.     The note accurately reflects work and decisions made by me.  Isiah Singh D.O.  11/9/2024  4:15 PM

## 2024-11-09 NOTE — ED TRIAGE NOTES
"Chief Complaint   Patient presents with    GLF     Pt says she was bowling at 1400 when her knee locked up and she fell forward onto her knees. - LOC -Head Strike     Knee Pain     8/10 right knee pain, radiating up thigh. Hx of scoliosis.      Blood Pressure : 138/83, Pulse: 78, Respiration: 18, Temperature: 35.9 °C (96.7 °F), Height: 160 cm (5' 3\"), Weight: 113 kg (250 lb), BMI (Calculated): 44.29, BSA (Calculated): 2.2, Pulse Oximetry: 95 %, O2 (LPM): 0    Pt is alert, oriented, and follows commands. Pt speaking in full sentences and responds appropriately to questions. No acute distress noted in triage and respirations are even and unlabored.      Pt placed in lobby and educated on triage process. Pt encouraged to alert staff for any changes in condition.   "

## 2024-11-10 NOTE — DISCHARGE INSTRUCTIONS
Your x-rays show that nothing is broken or displaced.  You do have some arthritis which is contributing to what happened today.  You have a cane at home use that to assist with ambulation, use Tylenol for pain and that use ice packs also.  Please follow-up with your physician at [facility] for further evaluation and treatment.  Return for any change or worsening symptoms.

## 2024-12-03 ENCOUNTER — PHARMACY VISIT (OUTPATIENT)
Dept: PHARMACY | Facility: MEDICAL CENTER | Age: 69
End: 2024-12-03
Payer: COMMERCIAL

## 2024-12-03 PROCEDURE — RXMED WILLOW AMBULATORY MEDICATION CHARGE: Performed by: INTERNAL MEDICINE

## 2024-12-03 RX ORDER — INFLUENZA A VIRUS A/VICTORIA/4897/2022 IVR-238 (H1N1) ANTIGEN (FORMALDEHYDE INACTIVATED), INFLUENZA A VIRUS A/CALIFORNIA/122/2022 SAN-022 (H3N2) ANTIGEN (FORMALDEHYDE INACTIVATED), AND INFLUENZA B VIRUS B/MICHIGAN/01/2021 ANTIGEN (FORMALDEHYDE INACTIVATED) 60; 60; 60 UG/.5ML; UG/.5ML; UG/.5ML
INJECTION, SUSPENSION INTRAMUSCULAR
Qty: 0.5 ML | Refills: 0 | OUTPATIENT
Start: 2024-12-03

## 2024-12-03 RX ORDER — COVID-19 VACCINE, MRNA 0.04 MG/.418ML
INJECTION, SUSPENSION INTRAMUSCULAR
Qty: 0.3 ML | Refills: 0 | OUTPATIENT
Start: 2024-12-03

## 2024-12-27 ENCOUNTER — TELEPHONE (OUTPATIENT)
Dept: HEALTH INFORMATION MANAGEMENT | Facility: OTHER | Age: 69
End: 2024-12-27
Payer: MEDICARE

## 2025-07-02 ENCOUNTER — HOSPITAL ENCOUNTER (OUTPATIENT)
Dept: RADIOLOGY | Facility: MEDICAL CENTER | Age: 70
End: 2025-07-02
Attending: ANESTHESIOLOGY
Payer: MEDICARE

## 2025-07-02 DIAGNOSIS — M54.16 LUMBAR RADICULOPATHY: ICD-10-CM

## 2025-07-02 DIAGNOSIS — M54.14 RADICULOPATHY, THORACIC REGION: ICD-10-CM

## 2025-07-02 PROCEDURE — 72146 MRI CHEST SPINE W/O DYE: CPT
